# Patient Record
Sex: MALE
[De-identification: names, ages, dates, MRNs, and addresses within clinical notes are randomized per-mention and may not be internally consistent; named-entity substitution may affect disease eponyms.]

---

## 2019-12-18 PROBLEM — Z00.00 ENCOUNTER FOR PREVENTIVE HEALTH EXAMINATION: Status: ACTIVE | Noted: 2019-12-18

## 2019-12-24 PROBLEM — Q39.6 ESOPHAGEAL DIVERTICULUM: Status: ACTIVE | Noted: 2019-12-24

## 2019-12-24 NOTE — DATA REVIEWED
[FreeTextEntry1] : EGD on 12/2/19 by Dr. 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 Path of the antrum showed gastric mucosa w/ mild non-specific chronic inflammation, negative for intestinal metaplasia, negative H. Pylori.

## 2019-12-24 NOTE — ASSESSMENT
[FreeTextEntry1] : \par \par I have reviewed the patient's medical records and diagnostic images at time of this office consultation and have made the following recommendation:\par 1.\par \par \par I personally performed the services described in the documentation, reviewed the documentation recorded by the scribe in my presence and it accurately and completely records my words and actions.\par \par I, Viv Velez NP, am scribing for and the presence of KEILA Christianson, the following sections HISTORY OF PRESENT ILLNESS, PAST MEDICAL/FAMILY/SOCIAL HISTORY; REVIEW OF SYSTEMS; VITAL SIGNS; PHYSICAL EXAM; DISPOSITION.\par \par

## 2019-12-24 NOTE — HISTORY OF PRESENT ILLNESS
[FreeTextEntry1] :  MD ESPOSITO, 69 year old male, ...smoker, w/ hx of ....., who presented\par \par EGD on 12/2/19 by Dr. 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 Path of the antrum showed gastric mucosa w/ mild non-specific chronic inflammation, negative for intestinal metaplasia, negative H. Pylori.\par \par Patient is here today for CT Sx consultation, referred by GI Dr. Adrian Dickinson.

## 2019-12-24 NOTE — CONSULT LETTER
[Dear  ___] : Dear  [unfilled], [( Thank you for referring [unfilled] for consultation for _____ )] : Thank you for referring [unfilled] for consultation for [unfilled] [Consult Letter:] : I had the pleasure of evaluating your patient, [unfilled]. [Please see my note below.] : Please see my note below. [Sincerely,] : Sincerely, [FreeTextEntry2] : Adrian Dickinson MD (GI/Referring) [Consult Closing:] : Thank you very much for allowing me to participate in the care of this patient.  If you have any questions, please do not hesitate to contact me. [FreeTextEntry3] : Raji Villafana MD, MPH \par System Director of Thoracic Surgery \par Director of [facility] [place] \par Professor Cardiovascular & Thoracic Surgery  \par [facility] [employer] at [facility]\par

## 2019-12-26 ENCOUNTER — APPOINTMENT (OUTPATIENT)
Dept: THORACIC SURGERY | Facility: CLINIC | Age: 69
End: 2019-12-26

## 2019-12-26 DIAGNOSIS — Q39.6 CONGENITAL DIVERTICULUM OF ESOPHAGUS: ICD-10-CM

## 2020-11-12 ENCOUNTER — OUTPATIENT (OUTPATIENT)
Dept: OUTPATIENT SERVICES | Facility: HOSPITAL | Age: 70
LOS: 1 days | Discharge: HOME | End: 2020-11-12

## 2020-11-12 ENCOUNTER — APPOINTMENT (OUTPATIENT)
Dept: OTOLARYNGOLOGY | Facility: CLINIC | Age: 70
End: 2020-11-12
Payer: MEDICAID

## 2020-11-12 DIAGNOSIS — Z78.9 OTHER SPECIFIED HEALTH STATUS: ICD-10-CM

## 2020-11-12 DIAGNOSIS — J32.9 CHRONIC SINUSITIS, UNSPECIFIED: ICD-10-CM

## 2020-11-12 DIAGNOSIS — J34.1 CYST AND MUCOCELE OF NOSE AND NASAL SINUS: ICD-10-CM

## 2020-11-12 PROCEDURE — 31231 NASAL ENDOSCOPY DX: CPT

## 2020-11-12 RX ORDER — ATORVASTATIN CALCIUM 20 MG/1
20 TABLET, FILM COATED ORAL
Refills: 0 | Status: ACTIVE | COMMUNITY

## 2020-11-12 RX ORDER — SENNOSIDES 8.6 MG TABLETS 8.6 MG/1
8.6 TABLET ORAL
Refills: 0 | Status: ACTIVE | COMMUNITY

## 2020-11-12 RX ORDER — TAMSULOSIN HYDROCHLORIDE 0.4 MG/1
0.4 CAPSULE ORAL
Refills: 0 | Status: ACTIVE | COMMUNITY

## 2020-11-12 RX ORDER — METOPROLOL SUCCINATE 25 MG/1
25 TABLET, EXTENDED RELEASE ORAL
Refills: 0 | Status: ACTIVE | COMMUNITY

## 2020-11-12 RX ORDER — MIRTAZAPINE 7.5 MG/1
TABLET, FILM COATED ORAL
Refills: 0 | Status: ACTIVE | COMMUNITY

## 2020-11-12 RX ORDER — TRAMADOL HYDROCHLORIDE 50 MG/1
50 TABLET, COATED ORAL
Refills: 0 | Status: ACTIVE | COMMUNITY

## 2020-11-12 RX ORDER — NAPROXEN 375 MG/1
375 TABLET ORAL
Refills: 0 | Status: ACTIVE | COMMUNITY

## 2020-11-12 RX ORDER — B-COMPLEX WITH VITAMIN C
500-200 TABLET ORAL
Refills: 0 | Status: ACTIVE | COMMUNITY

## 2020-11-12 NOTE — HISTORY OF PRESENT ILLNESS
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

## 2020-11-12 NOTE — PHYSICAL EXAM
[Nasal Endoscopy Performed] : nasal endoscopy was performed, see procedure section for findings [Midline] : trachea located in midline position [Edentulous] : edentulous [Normal] : no rashes [de-identified] : slight assymetry of L globe [de-identified] : some minimal L-sided restriction

## 2020-11-12 NOTE — REASON FOR VISIT
[Initial Evaluation] : an initial evaluation for [Family Member] : family member [FreeTextEntry2] : sinuses

## 2020-11-23 ENCOUNTER — APPOINTMENT (OUTPATIENT)
Dept: CT IMAGING | Facility: IMAGING CENTER | Age: 70
End: 2020-11-23

## 2020-12-16 DIAGNOSIS — J32.9 CHRONIC SINUSITIS, UNSPECIFIED: ICD-10-CM

## 2020-12-16 DIAGNOSIS — J34.1 CYST AND MUCOCELE OF NOSE AND NASAL SINUS: ICD-10-CM

## 2020-12-17 ENCOUNTER — APPOINTMENT (OUTPATIENT)
Dept: OTOLARYNGOLOGY | Facility: CLINIC | Age: 70
End: 2020-12-17